# Patient Record
Sex: FEMALE | Race: WHITE | NOT HISPANIC OR LATINO
[De-identification: names, ages, dates, MRNs, and addresses within clinical notes are randomized per-mention and may not be internally consistent; named-entity substitution may affect disease eponyms.]

---

## 2022-09-06 ENCOUNTER — APPOINTMENT (OUTPATIENT)
Age: 67
End: 2022-09-06

## 2022-09-07 ENCOUNTER — APPOINTMENT (OUTPATIENT)
Dept: ORTHOPEDIC SURGERY | Facility: CLINIC | Age: 67
End: 2022-09-07

## 2022-09-07 VITALS — HEIGHT: 66 IN

## 2022-09-07 DIAGNOSIS — S93.492A SPRAIN OF OTHER LIGAMENT OF LEFT ANKLE, INITIAL ENCOUNTER: ICD-10-CM

## 2022-09-07 DIAGNOSIS — S82.65XA NONDISPLACED FRACTURE OF LATERAL MALLEOLUS OF LEFT FIBULA, INITIAL ENCOUNTER FOR CLOSED FRACTURE: ICD-10-CM

## 2022-09-07 PROBLEM — Z00.00 ENCOUNTER FOR PREVENTIVE HEALTH EXAMINATION: Status: ACTIVE | Noted: 2022-09-07

## 2022-09-07 PROCEDURE — 99204 OFFICE O/P NEW MOD 45 MIN: CPT | Mod: 25

## 2022-09-07 PROCEDURE — 73610 X-RAY EXAM OF ANKLE: CPT | Mod: LT

## 2022-09-07 PROCEDURE — 73630 X-RAY EXAM OF FOOT: CPT | Mod: LT

## 2022-09-07 PROCEDURE — L4350: CPT | Mod: KX,LT

## 2022-09-07 PROCEDURE — 27786 TREATMENT OF ANKLE FRACTURE: CPT

## 2022-09-07 NOTE — ASSESSMENT
[FreeTextEntry1] : After their examination today at the office and review of their radiographs, they have sustained a minimally displaced fracture which appears to be healing of the lateral malleolus that she sustained 2-1/2 to 3 weeks ago and has stressed the ankle walking in regular sneakers and shoes as well as walking on the beach for the past 3 weeks as well as a moderate ankle sprain/contusion as result of their injury. I would recommend protection and immobilization as well as protection and support of the ankle in a supportive lace up ankle brace. The lace up ankle brace was manipulated and fitted to them today in the office. They were comfortable and felt adequate protection and support in the brace. I would like them to wear the brace full-time for the next 2 to 3 weeks in a good supportive lace up shoe or sneaker. I would recommend local ice therapy as well as elevation and the use of an anti-inflammatory for the next 3 to 5 days and then as needed. I did recommend elevation of the injured extremity to help decrease the localized swelling. I did also recommend refraining from any running, jumping or any strenuous activities and to limit walking and standing for the next few days. We discussed that an ankle sprain can take 6 to 8 weeks to fully heal and to recover. I would like to see them back in 3 weeks for repeat clinical and x-ray examination or earlier if there is any increased pain or concern. We also discussed in detail that ankle sprains can lead to chronic pain and symptoms of instability that may require further treatment.\par

## 2022-09-07 NOTE — IMAGING
[de-identified] : On examination of the right/left ankle and foot:\par \par Inspection: Mild swelling without ecchymosis over the lateral aspect of the ankle. No erythema noted. Hindfoot alignment is anatomic valgus.\par \par Palpation: They are tender over the anterior ankle joint line. Tenderness over the anterior lateral ankle over the area of the ATFL and CFL. They are tender medially over the distal tip of the medial malleolus and the deltoid ligament. They are also mildly tender over the lateral malleolus. There is no tenderness over the proximal, mid shaft tibia or fibula or syndesmosis. The leg compartments are soft and nontender. The calf is soft and nontender with a downgoing Gordillo test. There is mild tenderness over the peroneal tendons which are stable. They are mildly tender over the distal syndesmosis. Negative compression test of the syndesmosis. Non-tender over the fibula head. No pain over the course of the Achilles, or posterior tibial tendons. Non-tender over the sinus tarsi.\par \par On examination of the foot: No tenderness over the transverse tarsal joints, TMT or the Lisfranc joints on palpation and stress examination. No tenderness over the calcaneus, navicular, cuboid, cuneiforms, or metatarsals shafts. Full range of motion through the MTP joints. No pain on examination of the toes.\par \par ROM: 5 degrees of dorsiflexion, 25 degrees of plantar flexion, 15 degrees of inversion and 10 degrees of eversion with discomfort over the lateral ankle.\par \par Muscle Strength: 4/5 strength with resisted dorsiflexion, plantar flexion, inversion and eversion with pain on resisted eversion.\par \par Stability: No instability noted with varus/valgus stress, with slight laxity of the lateral ligament complex\par \par Tests: Anterior and posterior drawer test negative. Negative syndesmotic squeeze test.\par \par Neurologic Exam Sensation grossly intact to light touch throughout. 2+ Achilles tendon reflexes with downgoing Babinski test, no clonus\par \par Vascular: 2+ dorsalis pedis and posterior tibial pulses with brisk capillary refill in all toes.\par No pitting edema, no varicosities on bilateral lower extremities.\par \par XRAYS (3v Ankle and foot ): Xrays done today in the office were 3 views of the foot and 3 views of the ankle weight bearing with no limitation to today's study which reveal minimally displaced fracture of the lateral malleolus with healing noted. The ankle mortise is reduced and well aligned. There is no widening of the syndesmosis. No evidence of a osteochondral defect. The midfoot and forefoot joints are reduced and well aligned.

## 2022-09-07 NOTE — HISTORY OF PRESENT ILLNESS
[Sudden] : sudden [3] : 3 [0] : 0 [Dull/Aching] : dull/aching [Radiating] : radiating [Intermittent] : intermittent [Leisure] : leisure [Ice] : ice [Standing] : standing [Walking] : walking [de-identified] : Ms. WISDOM is a 66 year female who presents today for evaluation of their left ankle pain and swelling.  She states that 2-1/2 weeks ago she fell on a step and twisted her left ankle she noticed pain and swelling and some mild bruising over the left ankle and still has some localized swelling and some mild pain on the outside aspect of her left ankle.  She was seen at an urgent care facility a few days ago where she was diagnosed with a fibular fracture she has been walking in regular shoes over the past 2 weeks she has also been walking on the beach.  She denies any leg or calf pain she denies any catching or locking of the ankle and she denies any history of recurrent ankle sprains [] : no [FreeTextEntry1] : left ankle [FreeTextEntry3] : 8/16/22 [FreeTextEntry5] : New patient is here for left ankle. Patient fell down concrete stairs outside of her home and injured her ankle. Patient felt some pain and had swollen after the injury. Patient went to an Urgent Care this Monday where they took xrays and confirmed a fracture.  [FreeTextEntry7] : travels up the back of her calf [FreeTextEntry9] : wrapped it [de-identified] : Urgent Care [de-identified] : xray

## 2022-09-28 ENCOUNTER — APPOINTMENT (OUTPATIENT)
Dept: ORTHOPEDIC SURGERY | Facility: CLINIC | Age: 67
End: 2022-09-28

## 2022-09-28 VITALS — HEIGHT: 66 IN

## 2022-09-28 PROCEDURE — 73610 X-RAY EXAM OF ANKLE: CPT | Mod: LT

## 2022-09-28 PROCEDURE — 99213 OFFICE O/P EST LOW 20 MIN: CPT

## 2022-09-28 NOTE — IMAGING
[de-identified] : General: Well-nourished, in no apparent distress\par Psych: Clear speech, pleasant mood and affect\par Neurological: Alert and oriented to person, place, and time\par Gait: Normal\par Respiratory: Normal respiratory effort\par Skin: No rashes, no lesions, no open skin, no ecchymosis, no erythema\par \par Examination: On examination of the ankle and foot: There is mild swelling over the lateral ankle and hindfoot without ecchymosis or erythema noted. Hindfoot alignment is in anatomic valgus. The calf is soft and nontender with a downgoing Gordillo test. There is no pain over the proximal mid shaft or distal tibia or fibula. The leg compartments as well as the calf are soft and nontender. No pain of the ankle joint line or syndesmosis. There is no pain over the medial or lateral malleolus. There is residual tenderness over the area of the ATFL and CFL.  She has mild pain over the deltoid ligament is noted. \par There is no pain over the course of the Achilles, peroneal or posterior tibial tendons. No peroneal instability or subluxation is noted.   \par No instability on varus or valgus stress and anterior drawer testing. \par There is a good range of motion with dorsiflexion of 10 degrees, plantar flexion 25 degrees, inversion and eversion of 15 degrees with 4+/5 strength throughout all muscle groups. There is no pain over the calcaneus, subtalar joint, transverse tarsal joints, TMT or the Lisfranc joints. No pain over the navicular, cuboid, cuneiforms metatarsal shafts, MTP joints or on examination of the toes. Sensation is grossly intact throughout to light touch and there is 2+ DP/PT pulses.\par X-rays done today in the office 3 views of the ankle weight-bearing with no limitations reveals interval healing and consolidation of a minimally displaced fracture of the lateral malleolus without any interval displacement noted.  The ankle mortise and syndesmosis are reduced and well aligned and well maintained\par

## 2022-09-28 NOTE — HISTORY OF PRESENT ILLNESS
[Sudden] : sudden [1] : 2 [0] : 0 [Dull/Aching] : dull/aching [Localized] : localized [Intermittent] : intermittent [Leisure] : leisure [Ice] : ice [Standing] : standing [Walking] : walking [de-identified] : Is here for follow-up of her left ankle fracture.  She states that she is now close to 6 weeks out from her injury she has been walking in a good supportive lace up ankle brace without any ankle pain.  She does notice some minimal soreness at the end of the day more on the inside aspect of her ankle.  She denies any calf pain or any catching or locking of her ankle. [] : no [FreeTextEntry1] : left ankle [FreeTextEntry3] : 8/16/22 [FreeTextEntry9] : wrapped it [de-identified] :  [de-identified] : xray

## 2022-09-28 NOTE — ASSESSMENT
[FreeTextEntry1] : After her examination today in the office and review of her radiographs I do think she is doing very well healing conservatively her lateral malleolus fracture which remains minimally displaced.  She is healing well I would like her to continue to wear the supportive ankle brace for the next 3 to 4 weeks and a good supportive sneaker.  I did recommend that she can continue with low impact activity such as walking which she enjoys and refrain from running and jumping and cutting type of activities any strenuous activities.  She feels she is doing very well and I will see her back in 1 month for repeat clinical and x-ray examination

## 2022-11-02 ENCOUNTER — APPOINTMENT (OUTPATIENT)
Dept: ORTHOPEDIC SURGERY | Facility: CLINIC | Age: 67
End: 2022-11-02

## 2022-11-02 VITALS — HEIGHT: 66 IN

## 2022-11-02 DIAGNOSIS — S93.402D SPRAIN OF UNSPECIFIED LIGAMENT OF LEFT ANKLE, SUBSEQUENT ENCOUNTER: ICD-10-CM

## 2022-11-02 DIAGNOSIS — S82.65XD NONDISPLACED FRACTURE OF LATERAL MALLEOLUS OF LEFT FIBULA, SUBSEQUENT ENCOUNTER FOR CLOSED FRACTURE WITH ROUTINE HEALING: ICD-10-CM

## 2022-11-02 PROCEDURE — 73610 X-RAY EXAM OF ANKLE: CPT | Mod: LT

## 2022-11-02 PROCEDURE — 99213 OFFICE O/P EST LOW 20 MIN: CPT

## 2022-11-02 NOTE — IMAGING
[de-identified] : General: Well-nourished,  in no apparent distress\par Psych: Clear speech, pleasant mood and affect\par Neurological: Alert and oriented to person, place, and time\par Gait: Normal\par Respiratory: Normal respiratory effort\par Skin: No rashes, no lesions, no open skin, no ecchymosis, no erythema\par \par On examination of the ankle and foot there is no swelling or ecchymosis or erythema noted. Hindfoot alignment is in slight valgus. There is no pain over the proximal mid shaft or distal tibia or fibula. Negative syndesmotic squeeze test. There is no pain over the ankle joint, subtalar joint, transverse tarsal joints, or TMT joints. No pain over the medial or lateral malleolus. No pain over the proximal or mid shaft tibia or fibula. The leg compartments including the calf are soft and non-tender with a down-going Gordillo test. There is no pain over the ATFL or CFL laterally or deltoid ligament medially. \par There is no pain  over the course of the Achilles, peroneal or posterior tibial tendons. No ankle or hindfoot instability is noted. \par No pain over the calcaneus, talar neck or talar head. No pain over the navicular, cuboid, cuneiforms, metatarsal shafts or on examination of the toes. No web-space tenderness. No pain over the metatarsal heads or MTP joints. There is full flexion and extension of all the toes. The hallux and lesser toes are reduced and well aligned. \par Sensation is grossly intact throughout to light touch, there is 2+ Achilles tendon reflexes. There is 2+ DP/PT pulses, with brisk capillary refill in all of the toes.\par There is good range of motion of the ankle and hindfoot with 5/5 strength throughout all muscle groups.\par \par X-rays done today in the office 3 views of the ankle and foot which reveals no acute fractures or dislocations, with a healed minimally displaced distal lateral malleolus fracture which remains reduced and well aligned. The ankle mortise and syndesmosis are reduced and well aligned. The midfoot and forefoot joints are reduced and well aligned\par

## 2022-11-02 NOTE — HISTORY OF PRESENT ILLNESS
[Sudden] : sudden [1] : 2 [0] : 0 [Dull/Aching] : dull/aching [Localized] : localized [Intermittent] : intermittent [Leisure] : leisure [Ice] : ice [Standing] : standing [Walking] : walking [de-identified] : Is here for follow-up of her left ankle injury and lateral malleolus fracture.  She is currently doing very well.  She is walking in regular shoes and sneakers she has no ankle pain she denies any catching or locking or weakness of her ankle.  She feels she has made an excellent and full recovery. [] : no [FreeTextEntry1] : left ankle [FreeTextEntry3] : 8/16/22 [FreeTextEntry9] : wrapped it [de-identified] :  [de-identified] : xray

## 2022-11-02 NOTE — ASSESSMENT
[FreeTextEntry1] : After her examination today in the office and review of her radiographs she has done very well healing and recovering from her lateral malleolus fracture and ankle sprain.  I did recommend wearing good supportive shoes and sneakers for the next 4 to 6 weeks and she can start to return to walking or biking or elliptical  or swimming.  I did recommend refraining from running and jumping for the next 4 weeks while the ankle continues to strengthen and to stabilize and for any walking on uneven ground I did continue to recommend wearing a supportive lightweight lace up ankle brace for the next 4 to 6 weeks.  She is very thankful and very appreciative and I do feel she has done very well healing from her ankle fracture and ankle sprain which appears to be healed and well recovered.  I would like to see her back if she has any pain any limitations or concerns